# Patient Record
Sex: FEMALE | Race: WHITE | HISPANIC OR LATINO | URBAN - METROPOLITAN AREA
[De-identification: names, ages, dates, MRNs, and addresses within clinical notes are randomized per-mention and may not be internally consistent; named-entity substitution may affect disease eponyms.]

---

## 2020-05-27 ENCOUNTER — APPOINTMENT (OUTPATIENT)
Age: 85
Setting detail: DERMATOLOGY
End: 2020-05-27

## 2020-05-27 DIAGNOSIS — H02.83 DERMATOCHALASIS OF EYELID: ICD-10-CM

## 2020-05-27 DIAGNOSIS — L82.1 OTHER SEBORRHEIC KERATOSIS: ICD-10-CM

## 2020-05-27 PROBLEM — H02.834 DERMATOCHALASIS OF LEFT UPPER EYELID: Status: ACTIVE | Noted: 2020-05-27

## 2020-05-27 PROBLEM — H02.831 DERMATOCHALASIS OF RIGHT UPPER EYELID: Status: ACTIVE | Noted: 2020-05-27

## 2020-05-27 PROCEDURE — 99203 OFFICE O/P NEW LOW 30 MIN: CPT

## 2020-05-27 PROCEDURE — OTHER COUNSELING: OTHER

## 2020-05-27 PROCEDURE — OTHER MIPS QUALITY: OTHER

## 2020-05-27 PROCEDURE — OTHER REFERRAL: OTHER

## 2020-05-27 ASSESSMENT — LOCATION DETAILED DESCRIPTION DERM
LOCATION DETAILED: RIGHT MEDIAL BREAST 2-3:00 REGION
LOCATION DETAILED: RIGHT LATERAL SUPERIOR EYELID
LOCATION DETAILED: LEFT LATERAL SUPERIOR EYELID
LOCATION DETAILED: LEFT MEDIAL BREAST 9-10:00 REGION
LOCATION DETAILED: LEFT CLAVICULAR NECK
LOCATION DETAILED: RIGHT CLAVICULAR NECK

## 2020-05-27 ASSESSMENT — LOCATION ZONE DERM
LOCATION ZONE: TRUNK
LOCATION ZONE: EYELID
LOCATION ZONE: NECK

## 2020-05-27 ASSESSMENT — LOCATION SIMPLE DESCRIPTION DERM
LOCATION SIMPLE: RIGHT BREAST
LOCATION SIMPLE: LEFT ANTERIOR NECK
LOCATION SIMPLE: LEFT SUPERIOR EYELID
LOCATION SIMPLE: RIGHT ANTERIOR NECK
LOCATION SIMPLE: LEFT BREAST
LOCATION SIMPLE: RIGHT SUPERIOR EYELID

## 2020-07-21 ENCOUNTER — APPOINTMENT (OUTPATIENT)
Age: 85
Setting detail: DERMATOLOGY
End: 2020-08-01

## 2020-07-21 VITALS — TEMPERATURE: 97.8 F

## 2020-07-21 DIAGNOSIS — H02.83 DERMATOCHALASIS OF EYELID: ICD-10-CM

## 2020-07-21 PROBLEM — H02.831 DERMATOCHALASIS OF RIGHT UPPER EYELID: Status: ACTIVE | Noted: 2020-07-21

## 2020-07-21 PROBLEM — H02.839 DERMATOCHALASIS OF UNSPECIFIED EYE, UNSPECIFIED EYELID: Status: ACTIVE | Noted: 2020-07-21

## 2020-07-21 PROCEDURE — OTHER MIPS QUALITY: OTHER

## 2020-07-21 PROCEDURE — OTHER OTHER: OTHER

## 2020-07-21 PROCEDURE — 92285 EXTERNAL OCULAR PHOTOGRAPHY: CPT

## 2020-07-21 PROCEDURE — 99214 OFFICE O/P EST MOD 30 MIN: CPT

## 2020-07-21 ASSESSMENT — LOCATION SIMPLE DESCRIPTION DERM
LOCATION SIMPLE: RIGHT SUPERIOR EYELID
LOCATION SIMPLE: LEFT EYEBROW

## 2020-07-21 ASSESSMENT — LOCATION DETAILED DESCRIPTION DERM
LOCATION DETAILED: LEFT CENTRAL EYEBROW
LOCATION DETAILED: RIGHT LATERAL SUPERIOR EYELID

## 2020-07-21 ASSESSMENT — LOCATION ZONE DERM
LOCATION ZONE: EYELID
LOCATION ZONE: FACE

## 2020-07-21 NOTE — PROCEDURE: MIPS QUALITY
Quality 110: Preventive Care And Screening: Influenza Immunization: Influenza Immunization previously received during influenza season
Quality 226: Preventive Care And Screening: Tobacco Use: Screening And Cessation Intervention: Patient screened for tobacco use and is an ex/non-smoker
Detail Level: Detailed
Quality 111:Pneumonia Vaccination Status For Older Adults: Pneumococcal Vaccination not Administered or Previously Received, Reason not Otherwise Specified

## 2020-07-21 NOTE — PROCEDURE: OTHER
Note Text (......Xxx Chief Complaint.): This diagnosis correlates with the
Detail Level: Zone
Other (Free Text): MRD 1.75\\n2+ BUL dermatochalasis\\n\\nPatient has excess brow hooding causing trouble with driving and blocking vision. \\nPatient has a compensatory brow on both eyes. \\nExcess overlapping lid skin is greater on her left eye vs right.\\n\\nRecommend BULB. Educated patient on process of blepharoplasty. Swelling, bruising, some redness Is to be expected for 1 week. Small incision to remove excess skin, dissolvable sutures.  \\n\\nSurgery would be done in surgery center. Local and light IV sedation. \\nPhotos today. \\nPnati was provided clearance letter for her cardiologist since she is on ASA 81.

## 2023-10-12 ENCOUNTER — APPOINTMENT (OUTPATIENT)
Age: 88
Setting detail: DERMATOLOGY
End: 2023-10-12

## 2023-10-12 VITALS — TEMPERATURE: 97.9 F

## 2023-10-12 DIAGNOSIS — L57.8 OTHER SKIN CHANGES DUE TO CHRONIC EXPOSURE TO NONIONIZING RADIATION: ICD-10-CM

## 2023-10-12 DIAGNOSIS — L72.0 EPIDERMAL CYST: ICD-10-CM

## 2023-10-12 PROCEDURE — 99203 OFFICE O/P NEW LOW 30 MIN: CPT

## 2023-10-12 PROCEDURE — OTHER CONSULTATION EXCISION: OTHER

## 2023-10-12 PROCEDURE — OTHER DEFER: OTHER

## 2023-10-12 PROCEDURE — OTHER RETURN TO REFERRING PROVIDER: OTHER

## 2023-10-12 PROCEDURE — OTHER PRESCRIPTION MEDICATION MANAGEMENT: OTHER

## 2023-10-12 PROCEDURE — OTHER PRESCRIPTION: OTHER

## 2023-10-12 PROCEDURE — OTHER MIPS QUALITY: OTHER

## 2023-10-12 PROCEDURE — OTHER COUNSELING: OTHER

## 2023-10-12 RX ORDER — DOXYCYCLINE HYCLATE 100 MG/1
CAPSULE, GELATIN COATED ORAL BID
Qty: 14 | Refills: 0 | Status: ERX | COMMUNITY
Start: 2023-10-12

## 2023-10-12 NOTE — PROCEDURE: DEFER
Procedure To Be Performed At Next Visit: I&D
Size Of Lesion In Cm (Optional): 0
Detail Level: Detailed
Introduction Text (Please End With A Colon): The following procedure was deferred:

## 2023-10-12 NOTE — PROCEDURE: PRESCRIPTION MEDICATION MANAGEMENT
Render In Strict Bullet Format?: No
Initiate Treatment: doxycycline hyclate 100 mg capsule PO Frequency: BID Sig: Take one pill twice daily with a full glass of water and food until gone. Do NOT lie down until 1 hour after taking.\\nQuantity: 28 Capsule Days Supply: 14
Detail Level: Zone
Plan: Patient has a previous Rx from physician for Doxy 100 mg BID x 7 days. Advised to continue until finished and then start taking above Doxy Rx. Patient may use a warm compress daily and can take Tylenol prn pain.

## 2023-10-17 ENCOUNTER — APPOINTMENT (OUTPATIENT)
Age: 88
Setting detail: DERMATOLOGY
End: 2023-10-17

## 2023-10-17 DIAGNOSIS — L57.8 OTHER SKIN CHANGES DUE TO CHRONIC EXPOSURE TO NONIONIZING RADIATION: ICD-10-CM

## 2023-10-17 DIAGNOSIS — L72.0 EPIDERMAL CYST: ICD-10-CM

## 2023-10-17 DIAGNOSIS — D17 BENIGN LIPOMATOUS NEOPLASM: ICD-10-CM

## 2023-10-17 PROBLEM — D17.22 BENIGN LIPOMATOUS NEOPLASM OF SKIN AND SUBCUTANEOUS TISSUE OF LEFT ARM: Status: ACTIVE | Noted: 2023-10-17

## 2023-10-17 PROCEDURE — OTHER MIPS QUALITY: OTHER

## 2023-10-17 PROCEDURE — OTHER REASSURANCE: OTHER

## 2023-10-17 PROCEDURE — OTHER COUNSELING: OTHER

## 2023-10-17 PROCEDURE — OTHER RETURN TO REFERRING PROVIDER: OTHER

## 2023-10-17 PROCEDURE — OTHER PRESCRIPTION: OTHER

## 2023-10-17 PROCEDURE — 99213 OFFICE O/P EST LOW 20 MIN: CPT

## 2023-10-17 PROCEDURE — OTHER PRESCRIPTION MEDICATION MANAGEMENT: OTHER

## 2023-10-17 ASSESSMENT — LOCATION DETAILED DESCRIPTION DERM: LOCATION DETAILED: LEFT DISTAL DORSAL FOREARM

## 2023-10-17 ASSESSMENT — LOCATION SIMPLE DESCRIPTION DERM: LOCATION SIMPLE: LEFT FOREARM

## 2023-10-17 ASSESSMENT — LOCATION ZONE DERM: LOCATION ZONE: ARM

## 2023-10-17 NOTE — PROCEDURE: PRESCRIPTION MEDICATION MANAGEMENT
Continue Regimen: doxycycline hyclate 100 mg capsule PO Frequency: BID Sig: Take one pill twice daily with a full glass of water and food until gone. Do NOT lie down until 1 hour after taking.\\nQuantity: 28 Capsule Days Supply: 14
Detail Level: Zone
Plan: Patient has a previous Rx from physician for Doxy 100 mg BID x 7 days. Advised to continue until finished and then start taking above Doxy Rx. Patient may use a warm compress daily and can take Tylenol prn pain.
Render In Strict Bullet Format?: No

## 2023-11-21 ENCOUNTER — APPOINTMENT (OUTPATIENT)
Age: 88
Setting detail: DERMATOLOGY
End: 2023-11-21

## 2023-11-21 VITALS — TEMPERATURE: 98.6 F

## 2023-11-21 DIAGNOSIS — L72.0 EPIDERMAL CYST: ICD-10-CM

## 2023-11-21 DIAGNOSIS — L81.8 OTHER SPECIFIED DISORDERS OF PIGMENTATION: ICD-10-CM

## 2023-11-21 PROCEDURE — OTHER COUNSELING: OTHER

## 2023-11-21 PROCEDURE — OTHER MIPS QUALITY: OTHER

## 2023-11-21 PROCEDURE — OTHER RETURN TO REFERRING PROVIDER: OTHER

## 2023-11-21 PROCEDURE — 99212 OFFICE O/P EST SF 10 MIN: CPT

## 2023-11-21 PROCEDURE — OTHER REASSURANCE: OTHER

## 2023-11-21 PROCEDURE — OTHER ADDITIONAL NOTES: OTHER

## 2023-11-21 ASSESSMENT — LOCATION DETAILED DESCRIPTION DERM: LOCATION DETAILED: LEFT MEDIAL SUPERIOR CHEST

## 2023-11-21 ASSESSMENT — LOCATION SIMPLE DESCRIPTION DERM: LOCATION SIMPLE: CHEST

## 2023-11-21 ASSESSMENT — LOCATION ZONE DERM: LOCATION ZONE: TRUNK

## 2023-11-21 NOTE — PROCEDURE: ADDITIONAL NOTES
Detail Level: Simple
Render Risk Assessment In Note?: no
Additional Notes: No I+D or excision needed as cyst has resolved. Patient finished Doxycycline 100 mg BID x 2 weeks as previously advised. Advised patient and daughter if cyst returns to [facility] for evaluation.

## 2024-10-17 ENCOUNTER — APPOINTMENT (OUTPATIENT)
Age: 89
Setting detail: DERMATOLOGY
End: 2024-10-17

## 2024-10-17 DIAGNOSIS — B02.9 ZOSTER WITHOUT COMPLICATIONS: ICD-10-CM

## 2024-10-17 DIAGNOSIS — R20.8 OTHER DISTURBANCES OF SKIN SENSATION: ICD-10-CM

## 2024-10-17 DIAGNOSIS — Z79.899 OTHER LONG TERM (CURRENT) DRUG THERAPY: ICD-10-CM

## 2024-10-17 PROCEDURE — OTHER COUNSELING: OTHER

## 2024-10-17 PROCEDURE — OTHER HIGH RISK MEDICATION MONITORING: OTHER

## 2024-10-17 PROCEDURE — OTHER PRESCRIPTION MEDICATION MANAGEMENT: OTHER

## 2024-10-17 PROCEDURE — 99213 OFFICE O/P EST LOW 20 MIN: CPT

## 2024-10-17 PROCEDURE — OTHER PRESCRIPTION: OTHER

## 2024-10-17 RX ORDER — VALACYCLOVIR HYDROCHLORIDE 1 G/1
TABLET, FILM COATED ORAL TID
Qty: 21 | Refills: 1 | Status: ERX | COMMUNITY
Start: 2024-10-17

## 2024-10-17 ASSESSMENT — LOCATION SIMPLE DESCRIPTION DERM: LOCATION SIMPLE: T10 LEFT ANTERIOR DERMATOME

## 2024-10-17 ASSESSMENT — LOCATION DETAILED DESCRIPTION DERM: LOCATION DETAILED: T10 LEFT ANTERIOR DERMATOME

## 2024-10-17 ASSESSMENT — LOCATION ZONE DERM: LOCATION ZONE: TRUNK

## 2024-10-17 NOTE — PROCEDURE: PRESCRIPTION MEDICATION MANAGEMENT
Render In Strict Bullet Format?: No
Plan: Patient recently had a bad flare of osteoarthritis and was given PO Prednisone. Advised prednisone can suppress her immune system and might be why she broke out in Shingles recently. \\n\\nTold patient not to take the PO Prednisone or get the COVID vaccine until the Shingles is resolved
Detail Level: Zone
Initiate Treatment: Valtrex 1 gram tablet TID\\nQuantity: 21.0 Tablet\\nSig: Take one tablet TID x7 days

## 2024-10-24 ENCOUNTER — APPOINTMENT (OUTPATIENT)
Age: 89
Setting detail: DERMATOLOGY
End: 2024-10-24

## 2024-10-24 DIAGNOSIS — L90.5 SCAR CONDITIONS AND FIBROSIS OF SKIN: ICD-10-CM

## 2024-10-24 DIAGNOSIS — B02.9 ZOSTER WITHOUT COMPLICATIONS: ICD-10-CM

## 2024-10-24 DIAGNOSIS — R20.8 OTHER DISTURBANCES OF SKIN SENSATION: ICD-10-CM

## 2024-10-24 PROCEDURE — OTHER PRESCRIPTION MEDICATION MANAGEMENT: OTHER

## 2024-10-24 PROCEDURE — OTHER ADDITIONAL NOTES: OTHER

## 2024-10-24 PROCEDURE — OTHER REASSURANCE: OTHER

## 2024-10-24 PROCEDURE — OTHER COUNSELING: OTHER

## 2024-10-24 PROCEDURE — 99213 OFFICE O/P EST LOW 20 MIN: CPT

## 2024-10-24 ASSESSMENT — LOCATION SIMPLE DESCRIPTION DERM: LOCATION SIMPLE: T10 LEFT ANTERIOR DERMATOME

## 2024-10-24 ASSESSMENT — LOCATION ZONE DERM: LOCATION ZONE: TRUNK

## 2024-10-24 ASSESSMENT — LOCATION DETAILED DESCRIPTION DERM: LOCATION DETAILED: T10 LEFT ANTERIOR DERMATOME

## 2024-10-24 NOTE — PROCEDURE: PRESCRIPTION MEDICATION MANAGEMENT
Discontinue Regimen: Valtrex 1 gram Oral - tablet Frequency: TID, patient finished as directed yesterday
Render In Strict Bullet Format?: No
Detail Level: Zone

## 2024-10-24 NOTE — PROCEDURE: ADDITIONAL NOTES
Additional Notes: Aquaphor sample given, apply bid to affected area until resolved.
Detail Level: Simple
Render Risk Assessment In Note?: no

## 2024-11-05 ENCOUNTER — APPOINTMENT (OUTPATIENT)
Age: 89
Setting detail: DERMATOLOGY
End: 2024-11-05

## 2024-11-05 DIAGNOSIS — L90.5 SCAR CONDITIONS AND FIBROSIS OF SKIN: ICD-10-CM

## 2024-11-05 DIAGNOSIS — L81.4 OTHER MELANIN HYPERPIGMENTATION: ICD-10-CM

## 2024-11-05 DIAGNOSIS — D18.0 HEMANGIOMA: ICD-10-CM

## 2024-11-05 DIAGNOSIS — L57.8 OTHER SKIN CHANGES DUE TO CHRONIC EXPOSURE TO NONIONIZING RADIATION: ICD-10-CM

## 2024-11-05 DIAGNOSIS — L82.1 OTHER SEBORRHEIC KERATOSIS: ICD-10-CM

## 2024-11-05 DIAGNOSIS — D22 MELANOCYTIC NEVI: ICD-10-CM

## 2024-11-05 DIAGNOSIS — B02.9 ZOSTER WITHOUT COMPLICATIONS: ICD-10-CM

## 2024-11-05 DIAGNOSIS — Z71.89 OTHER SPECIFIED COUNSELING: ICD-10-CM

## 2024-11-05 PROBLEM — D18.01 HEMANGIOMA OF SKIN AND SUBCUTANEOUS TISSUE: Status: ACTIVE | Noted: 2024-11-05

## 2024-11-05 PROBLEM — D22.9 MELANOCYTIC NEVI, UNSPECIFIED: Status: ACTIVE | Noted: 2024-11-05

## 2024-11-05 PROCEDURE — OTHER PRESCRIPTION MEDICATION MANAGEMENT: OTHER

## 2024-11-05 PROCEDURE — 99213 OFFICE O/P EST LOW 20 MIN: CPT

## 2024-11-05 PROCEDURE — OTHER COUNSELING: OTHER

## 2024-11-05 PROCEDURE — OTHER ADDITIONAL NOTES: OTHER

## 2024-11-05 PROCEDURE — OTHER SUNSCREEN RECOMMENDATIONS: OTHER

## 2024-11-05 PROCEDURE — OTHER REASSURANCE: OTHER

## 2024-11-05 ASSESSMENT — LOCATION ZONE DERM
LOCATION ZONE: NECK
LOCATION ZONE: FACE
LOCATION ZONE: TRUNK

## 2024-11-05 ASSESSMENT — LOCATION SIMPLE DESCRIPTION DERM
LOCATION SIMPLE: LEFT ANTERIOR NECK
LOCATION SIMPLE: RIGHT CHEEK
LOCATION SIMPLE: RIGHT CLAVICULAR SKIN
LOCATION SIMPLE: T10 LEFT ANTERIOR DERMATOME
LOCATION SIMPLE: ABDOMEN

## 2024-11-05 ASSESSMENT — LOCATION DETAILED DESCRIPTION DERM
LOCATION DETAILED: LEFT CLAVICULAR NECK
LOCATION DETAILED: T10 LEFT ANTERIOR DERMATOME
LOCATION DETAILED: LEFT SUPERIOR ANTERIOR NECK
LOCATION DETAILED: RIGHT CLAVICULAR SKIN
LOCATION DETAILED: RIGHT INFERIOR CENTRAL MALAR CHEEK
LOCATION DETAILED: LEFT RIB CAGE

## 2024-11-05 NOTE — PROCEDURE: PRESCRIPTION MEDICATION MANAGEMENT
Discontinue Regimen: Valtrex 1 gram Oral - tablet Frequency: TID (patient finished as directed) 
Render In Strict Bullet Format?: No
Detail Level: Zone